# Patient Record
Sex: FEMALE | URBAN - METROPOLITAN AREA
[De-identification: names, ages, dates, MRNs, and addresses within clinical notes are randomized per-mention and may not be internally consistent; named-entity substitution may affect disease eponyms.]

---

## 2018-08-24 ENCOUNTER — OUTPATIENT (OUTPATIENT)
Dept: OUTPATIENT SERVICES | Facility: HOSPITAL | Age: 48
LOS: 1 days | End: 2018-08-24

## 2018-09-06 ENCOUNTER — INPATIENT (INPATIENT)
Facility: HOSPITAL | Age: 48
LOS: 2 days | Discharge: HOME | End: 2018-09-09
Payer: MEDICAID

## 2018-09-06 PROCEDURE — 73501 X-RAY EXAM HIP UNI 1 VIEW: CPT | Mod: 26,LT

## 2018-09-07 ENCOUNTER — OUTPATIENT (OUTPATIENT)
Dept: OUTPATIENT SERVICES | Facility: HOSPITAL | Age: 48
LOS: 1 days | End: 2018-09-07

## 2018-09-08 ENCOUNTER — OUTPATIENT (OUTPATIENT)
Dept: OUTPATIENT SERVICES | Facility: HOSPITAL | Age: 48
LOS: 1 days | End: 2018-09-08

## 2018-09-09 ENCOUNTER — OUTPATIENT (OUTPATIENT)
Dept: OUTPATIENT SERVICES | Facility: HOSPITAL | Age: 48
LOS: 1 days | End: 2018-09-09

## 2021-07-14 PROBLEM — Z00.00 ENCOUNTER FOR PREVENTIVE HEALTH EXAMINATION: Status: ACTIVE | Noted: 2021-07-14

## 2021-07-21 ENCOUNTER — RESULT CHARGE (OUTPATIENT)
Age: 51
End: 2021-07-21

## 2021-07-21 ENCOUNTER — APPOINTMENT (OUTPATIENT)
Age: 51
End: 2021-07-21
Payer: COMMERCIAL

## 2021-07-21 VITALS
TEMPERATURE: 97.8 F | OXYGEN SATURATION: 99 % | SYSTOLIC BLOOD PRESSURE: 120 MMHG | HEIGHT: 63 IN | DIASTOLIC BLOOD PRESSURE: 75 MMHG | HEART RATE: 96 BPM | BODY MASS INDEX: 26.58 KG/M2 | WEIGHT: 150 LBS

## 2021-07-21 DIAGNOSIS — Z82.3 FAMILY HISTORY OF STROKE: ICD-10-CM

## 2021-07-21 DIAGNOSIS — Z83.6 FAMILY HISTORY OF OTHER DISEASES OF THE RESPIRATORY SYSTEM: ICD-10-CM

## 2021-07-21 DIAGNOSIS — Z63.5 DISRUPTION OF FAMILY BY SEPARATION AND DIVORCE: ICD-10-CM

## 2021-07-21 DIAGNOSIS — Z80.3 FAMILY HISTORY OF MALIGNANT NEOPLASM OF BREAST: ICD-10-CM

## 2021-07-21 DIAGNOSIS — Z78.9 OTHER SPECIFIED HEALTH STATUS: ICD-10-CM

## 2021-07-21 LAB
BILIRUB UR QL STRIP: NEGATIVE
CLARITY UR: CLEAR
COLLECTION METHOD: NORMAL
GLUCOSE UR-MCNC: NEGATIVE
HCG UR QL: 0.2 EU/DL
HGB UR QL STRIP.AUTO: NEGATIVE
KETONES UR-MCNC: NEGATIVE
LEUKOCYTE ESTERASE UR QL STRIP: NEGATIVE
NITRITE UR QL STRIP: NEGATIVE
PH UR STRIP: 7.5
PROT UR STRIP-MCNC: NEGATIVE
SP GR UR STRIP: 1.01

## 2021-07-21 PROCEDURE — 99205 OFFICE O/P NEW HI 60 MIN: CPT | Mod: 25

## 2021-07-21 PROCEDURE — 36415 COLL VENOUS BLD VENIPUNCTURE: CPT

## 2021-07-21 PROCEDURE — 51701 INSERT BLADDER CATHETER: CPT

## 2021-07-21 RX ORDER — ESCITALOPRAM OXALATE 5 MG/1
TABLET, FILM COATED ORAL
Refills: 0 | Status: ACTIVE | COMMUNITY

## 2021-07-21 RX ORDER — CETIRIZINE HYDROCHLORIDE 10 MG/1
TABLET, FILM COATED ORAL
Refills: 0 | Status: ACTIVE | COMMUNITY

## 2021-07-21 RX ORDER — LANSOPRAZOLE 30 MG/1
30 CAPSULE, DELAYED RELEASE ORAL
Refills: 0 | Status: ACTIVE | COMMUNITY

## 2021-07-21 RX ORDER — CONJUGATED ESTROGENS 0.62 MG/G
0.62 CREAM VAGINAL
Qty: 1 | Refills: 3 | Status: ACTIVE | COMMUNITY
Start: 2021-07-21 | End: 1900-01-01

## 2021-07-21 SDOH — SOCIAL STABILITY - SOCIAL INSECURITY: DISRUPTION OF FAMILY BY SEPARATION AND DIVORCE: Z63.5

## 2021-07-21 NOTE — HISTORY OF PRESENT ILLNESS
[Cystocele (Obstetric)] : no [Vaginal Wall Prolapse] : no [Rectal Prolapse] : no [Unable To Restrain Bowel Movement] : no [x2] : nocturia two times a night [Urinary Frequency] : no [Feelings Of Urinary Urgency] : no [Pain During Urination (Dysuria)] : no [Urinary Tract Infection] : moderate [Constipation Obstructed Defecation] : no [] : no [Incomplete Emptying Of Stool] : no [Pelvic Pain] : mild [Vaginal Pain] : no [FreeTextEntry1] : \par  49 y/o presents with discomfort after urination, had a cystoscopy and was told she has cysts on her bladder, was on low dose abx for a long time, she reports that for the last 4 years she is getting 1-2 UTIs/year, she has been on different abx, 2 yrs ago she saw a different urologist, she got an us done of kidneys which was fine, she was told that if it continues to come back, she was started on myrbetriq for a few weeks and did not like the way she felt with it and does not remember the side effects of it, she has never been given estrogen, she reports she is not sure if they are related to intercourse, she reports dysuria after emptying, urgency, nocturia, frequency, rare YASMEEN, no UUI, denies hematuria, denies intermittent stream, denies pelvic pain, denies bulge, starting menopause, she reports that she avoids caffeine due to bladder symptoms, she drinks a lot of water,

## 2021-07-21 NOTE — LETTER BODY
[Dear  ___] : Dear  [unfilled], [Dear  ___] : Dear ~PARMJIT, [Attached please find my note.] : Attached please find my note. [Thank you very much for allowing me to participate in the care of this patient. If you have any questions, please do not hesitate to contact me] : Thank you very much for allowing me to participate in the care of this patient. If you have any questions, please do not hesitate to contact me.

## 2021-07-21 NOTE — PHYSICAL EXAM
[Chaperone Present] : A chaperone was present in the examining room during all aspects of the physical examination [No Acute Distress] : in no acute distress [Well developed] : well developed [Well Nourished] : ~L well nourished [Oriented x3] : oriented to person, place, and time [Normal Memory] : ~T memory was ~L unimpaired [Normal Mood/Affect] : mood and affect are normal [Cough] : no cough [No Edema] : ~T edema was not present [Tenderness] : ~T no ~M abdominal tenderness observed [Distended] : not distended [Inguinal LAD] : no adenopathy was noted in the inguinal lymph nodes [Warm and Dry] : was warm and dry to touch [Normal Gait] : gait was normal [Labia Majora] : were normal [Labia Minora] : were normal [Atrophy] : atrophy [No Bleeding] : there was no active vaginal bleeding [3] : 3 [Normal] : no abnormalities [Post Void Residual ____ml] : post void residual was [unfilled] ml [de-identified] : no prolapse

## 2021-07-21 NOTE — DISCUSSION/SUMMARY
[FreeTextEntry1] : \par Sofia presents for eval of urgency, 1 UTI/year, atrophy. On exam atrophy, no POP, PVR 100cc. She has tried OAB medications, we discussed OB vs IC, I think a lot of her symptoms are due to the amt of fluid she drinks, and we discussed BT. We discussed atrophy on exam, risks/benefits. She will start vaginal estrogen, return in 2 months for eval of symptoms. all questions were answered. \par [] premarin \par [] return in2  months (IC vs OAB)

## 2021-07-21 NOTE — PROCEDURE
[Straight Catheterization] : insertion of a straight catheter [Urinary Frequency] : urinary frequency [Patient] : the patient [___ Fr Straight Tip] : a [unfilled] in [place] straight tip catheter [None] : there were no complications with the catheter insertion [Clear] : clear [No Complications] : no complications [Tolerated Well] : the patient tolerated the procedure well [Post procedure instructions and information given] : Post procedure instructions and information were given and reviewed with patient. [0] : 0

## 2021-09-24 ENCOUNTER — APPOINTMENT (OUTPATIENT)
Age: 51
End: 2021-09-24
Payer: COMMERCIAL

## 2021-09-24 VITALS — DIASTOLIC BLOOD PRESSURE: 72 MMHG | SYSTOLIC BLOOD PRESSURE: 110 MMHG

## 2021-09-24 DIAGNOSIS — R35.0 FREQUENCY OF MICTURITION: ICD-10-CM

## 2021-09-24 LAB
BILIRUB UR QL STRIP: NORMAL
CLARITY UR: CLEAR
COLLECTION METHOD: NORMAL
GLUCOSE UR-MCNC: NORMAL
HCG UR QL: 0.2 EU/DL
HGB UR QL STRIP.AUTO: NORMAL
KETONES UR-MCNC: NORMAL
LEUKOCYTE ESTERASE UR QL STRIP: NORMAL
NITRITE UR QL STRIP: NORMAL
PH UR STRIP: 6.5
PROT UR STRIP-MCNC: NORMAL
SP GR UR STRIP: 1.01

## 2021-09-24 PROCEDURE — 99214 OFFICE O/P EST MOD 30 MIN: CPT

## 2021-09-24 PROCEDURE — 51798 US URINE CAPACITY MEASURE: CPT

## 2021-09-24 PROCEDURE — 81003 URINALYSIS AUTO W/O SCOPE: CPT | Mod: QW

## 2021-09-24 NOTE — PHYSICAL EXAM
[Chaperone Present] : A chaperone was present in the examining room during all aspects of the physical examination [FreeTextEntry1] : General: Well, appearing. Alert and orientated. No acute distress\par HEENT: Normocephalic, atraumatic and extraocular muscles appear to be intact \par Neck: Full range of motion, no obvious lymphadenopathy, deformities, or masses noted \par Respiratory: Speaking in full sentences comfortably, normal work of breathing and no cough during visit\par Musculoskeletal: active full range of motion in extremities \par Extremities: No upper extremity edema noted\par Skin: no obvious rash or skin lesions\par Neuro: Orientated X 3, speech is fluent, normal rate\par Psych: Normal mood and affect \par \par  [Vulvar Atrophy] : vulvar atrophy [Labia Majora] : were normal [Labia Minora] : were normal [Normal] : was normal [Atrophy] : atrophy

## 2021-09-24 NOTE — HISTORY OF PRESENT ILLNESS
[Cystocele (Obstetric)] : no [Vaginal Wall Prolapse] : no [Rectal Prolapse] : no [Unable To Restrain Bowel Movement] : no [x2] : nocturia two times a night [Urinary Frequency] : no [Feelings Of Urinary Urgency] : no [Pain During Urination (Dysuria)] : no [Urinary Tract Infection] : moderate [Constipation Obstructed Defecation] : no [] : no [Incomplete Emptying Of Stool] : no [Pelvic Pain] : mild [Vaginal Pain] : no [FreeTextEntry1] : \par Sofia presents for f/u on atrophy, discomfort with voiding, she is estrace with 65% improvement in her symptoms, she overall feels well , she reports she has a high ferritin and inflammation, she reports that she has no VB, she overall feels better

## 2021-09-24 NOTE — DISCUSSION/SUMMARY
[FreeTextEntry1] : \par Sofia presents for f/u on atrophy/urgency. Doing better with estrace, cont BT, advised can try desert harvest to see if it improves her symptoms, not interested in other oral medications, f/u in 6 months or sooner as needed, all questions answered.

## 2022-03-21 ENCOUNTER — APPOINTMENT (OUTPATIENT)
Dept: UROGYNECOLOGY | Facility: CLINIC | Age: 52
End: 2022-03-21

## 2022-04-03 NOTE — OB HISTORY
Called patient regarding her recent visit to urgent care. She said she is still experiencing some pain from the abscess but just started taking the medication she was prescribed. She requested a receipt for  today which was communicated to the PAR working. She did not have any further questions or concerns.   [Vaginal ___] : [unfilled] vaginal delivery(s) [Definite ___ (Date)] : the last menstrual period was [unfilled] [Last Pap Smear ___] : date of last pap smear was on [unfilled]

## 2023-07-18 ENCOUNTER — APPOINTMENT (OUTPATIENT)
Dept: UROGYNECOLOGY | Facility: CLINIC | Age: 53
End: 2023-07-18
Payer: COMMERCIAL

## 2023-07-18 VITALS
HEIGHT: 63 IN | BODY MASS INDEX: 27.46 KG/M2 | WEIGHT: 155 LBS | SYSTOLIC BLOOD PRESSURE: 114 MMHG | DIASTOLIC BLOOD PRESSURE: 77 MMHG

## 2023-07-18 DIAGNOSIS — R32 UNSPECIFIED URINARY INCONTINENCE: ICD-10-CM

## 2023-07-18 DIAGNOSIS — N39.0 URINARY TRACT INFECTION, SITE NOT SPECIFIED: ICD-10-CM

## 2023-07-18 PROCEDURE — 99214 OFFICE O/P EST MOD 30 MIN: CPT | Mod: 25

## 2023-07-18 PROCEDURE — 51798 US URINE CAPACITY MEASURE: CPT

## 2023-07-18 PROCEDURE — 51701 INSERT BLADDER CATHETER: CPT | Mod: 59

## 2023-07-18 RX ORDER — PHENAZOPYRIDINE HYDROCHLORIDE 100 MG/1
100 TABLET ORAL 3 TIMES DAILY
Qty: 90 | Refills: 0 | Status: ACTIVE | COMMUNITY
Start: 2023-07-18 | End: 1900-01-01

## 2023-07-18 NOTE — PHYSICAL EXAM
[Chaperone Present] : A chaperone was present in the examining room during all aspects of the physical examination [No Acute Distress] : in no acute distress [Well developed] : well developed [Well Nourished] : ~L well nourished [Warm and Dry] : was warm and dry to touch [Vulvar Atrophy] : vulvar atrophy [Labia Majora] : were normal [Labia Minora] : were normal [Normal Appearance] : general appearance was normal [Normal] : was normal [2] : 2 [Normal Memory] : ~T memory was ~L impaired [Normal Mood/Affect] : mood and/or affect are not normal [Tenderness] : ~T no ~M abdominal tenderness observed [Distended] : not distended [Inguinal LAD] : no adenopathy was noted in the inguinal lymph nodes [de-identified] : no POP

## 2023-07-18 NOTE — DISCUSSION/SUMMARY
[FreeTextEntry1] : \par Sofia presents for eval of OAB/UTI/bladder pain.\par \par 1. OAB: We reviewed her symptoms and exam findings. We discussed management options for overactive bladder including observation, behavioral modifications and bladder training, physical therapy and medications including anticholinergics and beta 3 agonists. We discussed if behavioral modifications and medications fail proceeding with urodynamics to further evaluate her symptoms. We discussed additional treatment options including sacral neuromodulation, PTNS and intra detrusor Botox. [facility] handout on overactive bladder and bladder training was given to her.\par \par 2. Recurrent UTI: discussed restarting estrace\par if any further UTIS on estrace, will get CT Urogram\par \par given abdominal cramping will get pelvic sono, kidney/bladder sono\par \par [] u/a cx\par [] estrace\par [] pyridium PRN\par [] if MH will get CT urogram/cysto\par [] pelvic sono/renal sonogram \par [] will call if UTI symptoms

## 2023-07-18 NOTE — PROCEDURE
[Straight Catheterization] : insertion of a straight catheter [Urinary Frequency] : urinary frequency [Patient] : the patient [___ Fr Straight Tip] : a [unfilled] in [place] straight tip catheter [None] : there were no complications with the catheter insertion [Clear] : clear [No Complications] : no complications [Tolerated Well] : the patient tolerated the procedure well [Post procedure instructions and information given] : Post procedure instructions and information were given and reviewed with patient. [0] : 0

## 2023-07-18 NOTE — HISTORY OF PRESENT ILLNESS
[Uterine Prolapse] : no [Vaginal Wall Prolapse] : no [Rectal Prolapse] : no [Unable To Restrain Bowel Movement] : no [x2] : nocturia two times a night [Feelings Of Urinary Urgency] : no [Pain During Urination (Dysuria)] : no [Urinary Tract Infection] : moderate [Constipation Obstructed Defecation] : no [Pelvic Pain] : no [] : no [FreeTextEntry1] : \par \par treated for UTI with cipro, reports she finished a few days ago and reports symptoms starting again, reports she had an episode of gross hematuria with positive cx, pain after urination, dull achy, lower abdominal cramping, lower back pain (center), feeling tired, she was good for two years until symptoms returned,  she was on estrace and it was helping a lot, she reports she felt bloating, avoids spicy foods, she takes azo as needed, c/o vaginal itching \par \par she did try myrbetriq for a short time \par \par was dx with Sjogren \par \par she reports she had an US of kidney and bladder and cystoscopy within the past few years

## 2023-07-19 ENCOUNTER — TRANSCRIPTION ENCOUNTER (OUTPATIENT)
Age: 53
End: 2023-07-19

## 2023-07-20 LAB
APPEARANCE: CLEAR
BACTERIA UR CULT: NORMAL
BACTERIA: NEGATIVE /HPF
BILIRUBIN URINE: NEGATIVE
BLOOD URINE: NEGATIVE
CANDIDA VAG CYTO: NOT DETECTED
CAST: 0 /LPF
COLOR: YELLOW
EPITHELIAL CELLS: 0 /HPF
G VAGINALIS+PREV SP MTYP VAG QL MICRO: NOT DETECTED
GLUCOSE QUALITATIVE U: NEGATIVE MG/DL
KETONES URINE: NEGATIVE MG/DL
LEUKOCYTE ESTERASE URINE: NEGATIVE
MICROSCOPIC-UA: NORMAL
NITRITE URINE: NEGATIVE
PH URINE: 6
PROTEIN URINE: NEGATIVE MG/DL
RED BLOOD CELLS URINE: 0 /HPF
SPECIFIC GRAVITY URINE: 1.01
T VAGINALIS VAG QL WET PREP: NOT DETECTED
UROBILINOGEN URINE: 0.2 MG/DL
WHITE BLOOD CELLS URINE: 0 /HPF

## 2023-09-20 ENCOUNTER — APPOINTMENT (OUTPATIENT)
Dept: UROGYNECOLOGY | Facility: CLINIC | Age: 53
End: 2023-09-20
Payer: COMMERCIAL

## 2023-09-20 VITALS
DIASTOLIC BLOOD PRESSURE: 68 MMHG | OXYGEN SATURATION: 98 % | SYSTOLIC BLOOD PRESSURE: 101 MMHG | RESPIRATION RATE: 14 BRPM | HEART RATE: 73 BPM

## 2023-09-20 DIAGNOSIS — R30.0 DYSURIA: ICD-10-CM

## 2023-09-20 DIAGNOSIS — R39.15 URGENCY OF URINATION: ICD-10-CM

## 2023-09-20 DIAGNOSIS — N90.5 ATROPHY OF VULVA: ICD-10-CM

## 2023-09-20 PROCEDURE — 51798 US URINE CAPACITY MEASURE: CPT

## 2023-09-20 PROCEDURE — 99213 OFFICE O/P EST LOW 20 MIN: CPT

## 2023-10-20 RX ORDER — ESTRADIOL 0.1 MG/G
0.1 CREAM VAGINAL
Qty: 1 | Refills: 3 | Status: ACTIVE | COMMUNITY
Start: 2023-07-18 | End: 1900-01-01